# Patient Record
Sex: MALE | Race: OTHER | URBAN - METROPOLITAN AREA
[De-identification: names, ages, dates, MRNs, and addresses within clinical notes are randomized per-mention and may not be internally consistent; named-entity substitution may affect disease eponyms.]

---

## 2023-02-22 ENCOUNTER — EMERGENCY (EMERGENCY)
Facility: HOSPITAL | Age: 33
LOS: 0 days | Discharge: HOME | End: 2023-02-22
Attending: EMERGENCY MEDICINE
Payer: MEDICAID

## 2023-02-22 VITALS
OXYGEN SATURATION: 97 % | RESPIRATION RATE: 18 BRPM | HEART RATE: 92 BPM | TEMPERATURE: 98 F | DIASTOLIC BLOOD PRESSURE: 93 MMHG | SYSTOLIC BLOOD PRESSURE: 132 MMHG

## 2023-02-22 VITALS
WEIGHT: 179.9 LBS | HEART RATE: 75 BPM | HEIGHT: 78 IN | TEMPERATURE: 98 F | SYSTOLIC BLOOD PRESSURE: 112 MMHG | DIASTOLIC BLOOD PRESSURE: 81 MMHG | OXYGEN SATURATION: 97 % | RESPIRATION RATE: 79 BRPM
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
## 2023-02-22 DIAGNOSIS — M79.671 PAIN IN RIGHT FOOT: ICD-10-CM

## 2023-02-22 DIAGNOSIS — S42.252A DISPLACED FRACTURE OF GREATER TUBEROSITY OF LEFT HUMERUS, INITIAL ENCOUNTER FOR CLOSED FRACTURE: ICD-10-CM
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
## 2023-02-22 DIAGNOSIS — S20.212A CONTUSION OF LEFT FRONT WALL OF THORAX, INITIAL ENCOUNTER: ICD-10-CM

## 2023-02-22 DIAGNOSIS — R07.81 PLEURODYNIA: ICD-10-CM

## 2023-02-22 DIAGNOSIS — M25.512 PAIN IN LEFT SHOULDER: ICD-10-CM

## 2023-02-22 PROCEDURE — 73620 X-RAY EXAM OF FOOT: CPT | Mod: 26,RT

## 2023-02-22 PROCEDURE — 99285 EMERGENCY DEPT VISIT HI MDM: CPT

## 2023-02-22 PROCEDURE — 73020 X-RAY EXAM OF SHOULDER: CPT | Mod: 26,LT

## 2023-02-22 PROCEDURE — 71101 X-RAY EXAM UNILAT RIBS/CHEST: CPT | Mod: 26,LT

## 2023-02-22 PROCEDURE — 73030 X-RAY EXAM OF SHOULDER: CPT | Mod: 26,RT

## 2023-02-22 PROCEDURE — 73030 X-RAY EXAM OF SHOULDER: CPT | Mod: 26,LT,77

## 2023-02-22 RX ORDER — IBUPROFEN 200 MG
1 TABLET ORAL
Qty: 30 | Refills: 0
Start: 2023-02-22 | End: 2023-02-26

## 2023-02-22 RX ORDER — KETOROLAC TROMETHAMINE 30 MG/ML
30 SYRINGE (ML) INJECTION ONCE
Refills: 0 | Status: DISCONTINUED | OUTPATIENT
Start: 2023-02-22 | End: 2023-02-22

## 2023-02-22 RX ORDER — LIDOCAINE 4 G/100G
1 CREAM TOPICAL ONCE
Refills: 0 | Status: COMPLETED | OUTPATIENT
Start: 2023-02-22 | End: 2023-02-22

## 2023-02-22 RX ORDER — METHOCARBAMOL 500 MG/1
750 TABLET, FILM COATED ORAL ONCE
Refills: 0 | Status: COMPLETED | OUTPATIENT
Start: 2023-02-22 | End: 2023-02-22

## 2023-02-22 RX ORDER — METHOCARBAMOL 500 MG/1
1 TABLET, FILM COATED ORAL
Qty: 15 | Refills: 0
Start: 2023-02-22 | End: 2023-02-26

## 2023-02-22 RX ADMIN — METHOCARBAMOL 750 MILLIGRAM(S): 500 TABLET, FILM COATED ORAL at 18:32

## 2023-02-22 RX ADMIN — LIDOCAINE 1 PATCH: 4 CREAM TOPICAL at 18:31

## 2023-02-22 RX ADMIN — Medication 30 MILLIGRAM(S): at 18:31

## 2023-02-22 NOTE — ED PROVIDER NOTE - CLINICAL SUMMARY MEDICAL DECISION MAKING FREE TEXT BOX
Otherwise healthy 33M pw L posterior ribcage, R heel pain s/p slip and fall down stairs last night. AF, VSS. Well appearing, in NAD. Exam as noted in PE. Plan for XR imaging r/o acute injury, pain control. Re-eval. Otherwise healthy 33M pw L posterior ribcage, R heel pain s/p slip and fall down stairs last night. AF, VSS. Well appearing, in NAD. Exam as noted in PE. Plan for XR imaging r/o acute injury, pain control. Re-eval.  XR imaging negative for fracture or dislocation. On re-eval, pt reports improvement in symptoms w/ ED meds. Stable for d/c home. Given scripts for Motrin, Robaxin. Recommend PCP f/u. Return signs / symptoms d/w pt, mother. They understand / agree w/ this plan. Otherwise healthy 33M pw L posterior ribcage, R heel pain s/p slip and fall down stairs last night. AF, VSS. Well appearing, in NAD. Exam as noted in PE. Plan for XR imaging r/o acute injury, pain control. Re-eval.  On re-eval, pt reports improvement in symptoms w/ ED meds. XR L shoulder w/ + greater tuberosity fx, Ortho consulted. Pt care signed out at change of shift.

## 2023-02-22 NOTE — ED PROVIDER NOTE - CARE PLAN
1 Principal Discharge DX:	Rib contusion   Principal Discharge DX:	Rib contusion  Secondary Diagnosis:	Closed fracture of left shoulder

## 2023-02-22 NOTE — ED ADULT NURSE REASSESSMENT NOTE - NS ED NURSE REASSESS COMMENT FT1
Pt received from day RN. Pt AOX4 and reports no acute distress at this time. Pt reports pain relief due to meds. Ortho at bedside and will continue to monitor.

## 2023-02-22 NOTE — ED PROVIDER NOTE - PHYSICAL EXAMINATION
GEN: Awake, alert, interactive, NAD.  HEAD AND NECK: NC/AT. Airway patent. Neck supple.   EYES:  Clear b/l. EOMI. PERRL.   ENT: Moist mucus membranes.   CARDIAC: Regular rate, regular rhythm. No evident pedal edema.    RESP/CHEST: Normal respiratory effort with no use of accessory muscles or retractions. Clear throughout on auscultation. + TTP L posterior ribcage.   ABD: Soft, non-distended, non-tender. No rebound, no guarding.   BACK: No midline spinal TTP. No CVAT.   EXTREMITIES: Moving all extremities with no apparent deformities.   SKIN: Warm, dry, intact normal color. No rash.   NEURO: AOx3, CN II-XII grossly intact, no focal deficits.   PSYCH: Appropriate mood and affect.

## 2023-02-22 NOTE — ED PROVIDER NOTE - PROGRESS NOTE DETAILS
Patient care signed out by Dr. Syed pending ortho consult.  Patient seen and placed in sling by ortho, refer to note.  Patient to follow up o/p.  Discussed results and outcome of today's visit with the patient/family, copy of results given with discharge.  Patient advised to arrange freitas follow up with their PMD and/or any provided referral(s) within the next few days and are cautioned to return to the Emergency Department for any worsening symptoms.  Patient advised that their doctor may call  to follow up on the specific results of the tests performed today in the emergency department.   Patient appears well on discharge.

## 2023-02-22 NOTE — ED PROVIDER NOTE - PATIENT PORTAL LINK FT
You can access the FollowMyHealth Patient Portal offered by [facility] by registering at the following website: [email or web address]. By joining [employer]’s FollowMyHealth portal, you will also be able to view your health information using other applications (apps) compatible with our system. You can access the FollowMyHealth Patient Portal offered by [facility] by registering at the following website: [email or web address]. By joining [employer]’s FollowMyHealth portal, you will also be able to view your health information using other applications (apps) compatible with our system.

## 2023-02-22 NOTE — ED PROVIDER NOTE - WR ORDER DATE AND TIME 2
22-Feb-2023 17:01 Cimzia Counseling:  I discussed with the patient the risks of Cimzia including but not limited to immunosuppression, allergic reactions and infections.  The patient understands that monitoring is required including a PPD at baseline and must alert us or the primary physician if symptoms of infection or other concerning signs are noted.

## 2023-02-22 NOTE — ED ADULT TRIAGE NOTE - CHIEF COMPLAINT QUOTE
Patient c/o pain to left flank, left shoulder and right heel after falling down 6 steps in his home last night. Patient took Tylenol last night with no relief. Denies any head injury or LOC.  no pmh

## 2023-02-22 NOTE — ED ADULT NURSE NOTE - OBJECTIVE STATEMENT
Received 33 yr old male patient A&Ox4, breathing even and unlabored breaths. Patient c/o pain to left flank, left shoulder and right heel after falling down 6 steps in his home last night. Patient took Tylenol last night with no relief. Denies any head injury or LOC. No PMH. Made comfortable to area, pt medicated. No acute distress noted or verbalized.

## 2023-02-22 NOTE — ED PROVIDER NOTE - OBJECTIVE STATEMENT
33M pw L posterior rib cage pain, R heel pain s/p slip and fall down 6 stairs last night. Denies head strike, no LOC. Pt took Tylenol x 2 PTA w/o relief. Pt able to ambulate, but reports 'shocking' pain radiating up RLE from heel. Pt endorses rib pain w/ breath, laugh.     PMH none, PSH none, NKDA, no meds.

## 2023-02-23 NOTE — CONSULT NOTE ADULT - SUBJECTIVE AND OBJECTIVE BOX
Consult Note: Orthopaedic Surgery     Patient is a 33M community-ambulator without assistive devices who presents to the [facility] ED one day after a fall at home with a chief complaint of Left shoulder pain. Patient states that he fell down three-to-four steps at home in his socks landing on his back/ribs. more-so on the Left side. Denies any associated head-strike, loss of consciousness, or any other traumas. Localizing pain to Left shoulder as well as the posterior ribs on the Left side and Right foot as a result of the fall, denies radiation of pain elsewhere. Endorses maintained ability to ambulate immediately following the injury. Patient now with trouble lifting the Left arm. Denies any numbness or tingling in the affected extremity. Denies having any other pain elsewhere. Denies any previous orthopaedic history. Denies fevers, chills, headaches, chest pain, shortness of breath, nausea, vomiting, or any additional orthopaedic concerns or complaints at time of current encounter.      PAST MEDICAL & SURGICAL HISTORY:  Denies       ALLERGIES:  No Known Allergies      VITAL SIGNS:  T(C): 36.8 (02-22-23 @ 20:42), Max: 36.8 (02-22-23 @ 20:42)  HR: 92 (02-22-23 @ 20:42) (75 - 92)  BP: 132/93 (02-22-23 @ 20:42) (112/81 - 132/93)  RR: 18 (02-22-23 @ 20:42) (18 - 79)  SpO2: 97% (02-22-23 @ 20:42) (97% - 97%)      PHYSICAL EXAM  Gen: NAD, Resting comfortably    LUE:  Skin intact, no erythema or ecchymosis.   Minimal bony tenderness to palpation over the shoulder, consistent with the location of the greater tuberosity.    + Ax/Musc/Rad/Uln/Med/AIN/PIN.   + SILT C5-T1.    + Radial pulse.    Shoulder ABduction: 0-45 degrees (Active) / 0-170 degrees (Passive).   Shoulder Forward Flexion: 0-30 degrees (Active) / 0-160 degrees (Passive).   Shoulder External Rotation: 0-90 (Actively and Passively).   Shoulder Internal Rotation: -90-0 (Actively and Passively).   No micromotion tenderness.   Compartments soft and compressible.   No calf tenderness.   Positive Empty Can Test.   Equivocal Full Can Test.   Positive Scapular Rotation Test.   Negative Pushoff Test.   Negative Neer Impingement Test.       SECONDARY SURVEY  RLE/LLE/RUE: No TTP over bony prominences, SILT, palpable pulses, full/painless range of motion, compartments soft; Minimal-mild pain in the Right foot, however no impairment of patient's ability to bear weight appreciated.    Spine: No bony tenderness. No palpable step-offs.         IMAGING:  XR Left Shoulder: Mildly displaced fractures of the greater tuberosity; humeral head is located within glenoid fossa; acromioclavicular joint is aligned and intact; No pathologic calcifications or soft tissue abnormalities.        ASSESSMENT:  Patient is a 33M with mildly displaced fractures of the greater tuberosity of the Left humeral head with high suspicion for rotator cuff tear.     PLAN:  - NWB LUE in Sling.   - Pain control.    - Ice and elevate as tolerated.   - No acute orthopaedic surgical intervention indicated at this time.   - Orthopaedically stable for discharge; Will continue to monitor if/while admitted.   - Recommend follow up with Dr. Main in the outpatient setting. Call office to schedule appointment.  - All questions answered to patient's satisfaction. Patient/family understand and agree with plan.  - Discussed with Dr. Main who is aware of and in agreement with the above plan.